# Patient Record
Sex: MALE | Race: WHITE | NOT HISPANIC OR LATINO | Employment: FULL TIME | URBAN - METROPOLITAN AREA
[De-identification: names, ages, dates, MRNs, and addresses within clinical notes are randomized per-mention and may not be internally consistent; named-entity substitution may affect disease eponyms.]

---

## 2018-01-18 ENCOUNTER — HOSPITAL ENCOUNTER (EMERGENCY)
Facility: HOSPITAL | Age: 28
Discharge: HOME | End: 2018-01-19
Attending: EMERGENCY MEDICINE

## 2018-01-18 DIAGNOSIS — M25.511 ACUTE PAIN OF RIGHT SHOULDER: Primary | ICD-10-CM

## 2018-01-18 DIAGNOSIS — S49.91XA INJURY OF RIGHT ACROMIOCLAVICULAR JOINT, INITIAL ENCOUNTER: ICD-10-CM
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
## 2018-01-18 PROCEDURE — 99283 EMERGENCY DEPT VISIT LOW MDM: CPT

## 2018-01-19 VITALS
SYSTOLIC BLOOD PRESSURE: 122 MMHG | BODY MASS INDEX: 24.09 KG/M2 | HEART RATE: 75 BPM | TEMPERATURE: 98 F | HEIGHT: 69 IN | RESPIRATION RATE: 19 BRPM | DIASTOLIC BLOOD PRESSURE: 71 MMHG | OXYGEN SATURATION: 100 % | WEIGHT: 162.69 LBS

## 2018-01-19 RX ORDER — HYDROCODONE BITARTRATE AND ACETAMINOPHEN 10; 325 MG/1; MG/1
1 TABLET ORAL EVERY 4 HOURS PRN
Qty: 13 TABLET | Refills: 0 | Status: SHIPPED | OUTPATIENT
Start: 2018-01-19

## 2018-01-19 NOTE — ED PROVIDER NOTES
SCRIBE #1 NOTE: I, Harmony De La O, am scribing for, and in the presence of, Javi Menendez MD. I have scribed the entire note.      History      Chief Complaint   Patient presents with    Shoulder Injury     slip and fall on ice; R shoulder pain, possible dislocation, distal pulse intact       Review of patient's allergies indicates:  No Known Allergies     HPI   HPI    1/18/2018, 11:54 PM   History obtained from the patient      History of Present Illness: Satish Aragon is a 27 y.o. male patient who presents to the Emergency Department for R shoulder pain which onset suddenly after he slipped and fell on ice tonight. He reports that he landed on his R shoulder. Symptoms are constant and moderate in severity. No mitigating or exacerbating factors reported. No other sxs reported. Patient denies joint swelling, extremity weakness/numbness, paresthesias, neck pain, and all other sxs at this time. No further complaints or concerns at this time.       Arrival mode: Personal vehicle    PCP: Primary Doctor No     Past Medical History:  Past medical history reviewed not relevant      Past Surgical History:  Past surgical history reviewed not relevant      Family History:  Family history reviewed not relevant      Social History:  Social History    Social History Main Topics    Social History Main Topics    Smoking status: Unknown if ever smoked    Smokeless tobacco: Unknown if ever used    Alcohol Use: Unknown drinking history    Drug Use: Unknown if ever used    Sexual Activity: Unknown     ROS   Review of Systems   Constitutional: Negative for fever.   HENT: Negative for sore throat.    Respiratory: Negative for shortness of breath.    Cardiovascular: Negative for chest pain.   Gastrointestinal: Negative for nausea.   Genitourinary: Negative for dysuria.   Musculoskeletal: Positive for arthralgias (R shoulder pain). Negative for back pain, joint swelling, neck pain and neck stiffness.   Skin: Negative for  color change, rash and wound.   Neurological: Negative for weakness and numbness.        (-) paresthesias   Hematological: Does not bruise/bleed easily.   All other systems reviewed and are negative.      Physical Exam      Initial Vitals [01/18/18 2248]   BP Pulse Resp Temp SpO2   (!) 154/97 83 18 98.4 °F (36.9 °C) 100 %      MAP       116          Physical Exam  Nursing Notes and Vital Signs Reviewed.  Constitutional: Patient is in no acute distress. Awake and alert. Well-developed and well-nourished.  Head: Atraumatic. Normocephalic.  Eyes: PERRL. EOM intact. Conjunctivae are not pale. No scleral icterus.  ENT: Mucous membranes are moist. Oropharynx is clear and symmetric.    Neck: Supple. Full ROM.   Cardiovascular: Regular rate. Regular rhythm. No murmurs, rubs, or gallops. Distal pulses are 2+ and symmetric.  Pulmonary/Chest: No respiratory distress. Clear to auscultation bilaterally. No wheezing, rales, or rhonchi.  Abdominal: Non-distended.  Musculoskeletal: Moves all extremities. No obvious deformities.    R shoulder: TTP to R AC joint. There is full ROM. Pulses are 2+. Normal sensation. No swelling.  Skin: Warm and dry.  Neurological:  Alert, awake, and appropriate.  Normal speech.  No acute focal neurological deficits are appreciated.  Psychiatric: Normal affect. Good eye contact. Appropriate in content.    ED Course    Orthopedic Injury  Date/Time: 1/19/2018 12:10 AM  Performed by: LASHON EUBANKS  Authorized by: LASHON EUBANKS     Injury:     Injury location:  Shoulder    Location details:  Right shoulder    Injury type:  Soft tissue      Pre-procedure assessment:     Neurovascular status: Neurovascularly intact      Distal perfusion: normal      Neurological function: normal        Selections made in this section will also lock the Injury type section above.:     Immobilization:  Sling    Complications: No      Specimens: No      Implants: No    Post-procedure assessment:     Neurovascular status:  "Neurovascularly intact      Distal perfusion: normal      Neurological function: normal      Patient tolerance:  Patient tolerated the procedure well with no immediate complications      ED Vital Signs:  Vitals:    01/18/18 2248 01/19/18 0036   BP: (!) 154/97 122/71   Pulse: 83 75   Resp: 18 19   Temp: 98.4 °F (36.9 °C) 98 °F (36.7 °C)   TempSrc: Oral    SpO2: 100% 100%   Weight: 73.8 kg (162 lb 11.2 oz)    Height: 5' 9" (1.753 m)      Imaging Results:  Ordered, reviewed, and independently interpreted by the ED provider.  Study: X-ray Shoulder Trauma Right  Findings: NAF    The Emergency Provider reviewed the vital signs and test results, which are outlined above.    ED Discussion     12:13 AM: Reassessed pt at this time. Discussed with pt imaging results. Discussed pt dx and plan of tx. Informed pt to follow up with PCP. All questions and concerns were addressed at this time. Pt expresses understanding of information and instructions, and is comfortable with plan to discharge. Pt is stable for discharge.    I discussed with patient that evaluation in the ED does not suggest any emergent or life threatening medical conditions requiring immediate intervention beyond what was provided in the ED, and I believe patient is safe for discharge.  Regardless, an unremarkable evaluation in the ED does not preclude the development or presence of a serious of life threatening condition. As such, patient was instructed to return immediately for any worsening or change in current symptoms.      ED Medication(s):  Medications - No data to display    Discharge Medication List as of 1/19/2018 12:12 AM      START taking these medications    Details   hydrocodone-acetaminophen 10-325mg (NORCO)  mg Tab Take 1 tablet by mouth every 4 (four) hours as needed for Pain., Starting Fri 1/19/2018, Print             Follow-up Information     [facility] In 1 week.    Specialty:  Orthopedics  Contact information:  [address]  [facility] [id number] [phone]  Additional information:  (off [facility]) 1st floor           [facility]    Specialty:  Emergency Medicine  Why:  If symptoms worsen  Contact information:  [address] [facility] [id number] [phone]                  Medical Decision Making    Medical Decision Making:   Clinical Tests:   Radiological Study: Ordered and Reviewed           Scribe Attestation:   Scribe #1: I performed the above scribed service and the documentation accurately describes the services I performed. I attest to the accuracy of the note.    Attending:   Physician Attestation Statement for Scribe #1: I, Javi Menendez MD, personally performed the services described in this documentation, as scribed by Harmony De La O, in my presence, and it is both accurate and complete.          Clinical Impression       ICD-10-CM ICD-9-CM   1. Acute pain of right shoulder M25.511 719.41   2. Fall W19.XXXA E888.9   3. Injury of right acromioclavicular joint, initial encounter S49.91XA 959.2       Disposition:   Disposition: Discharged  Condition: Stable         Javi Menendez MD  01/19/18 0052